# Patient Record
Sex: FEMALE | Race: WHITE | NOT HISPANIC OR LATINO | Employment: OTHER | URBAN - METROPOLITAN AREA
[De-identification: names, ages, dates, MRNs, and addresses within clinical notes are randomized per-mention and may not be internally consistent; named-entity substitution may affect disease eponyms.]

---

## 2017-01-01 ENCOUNTER — HOME CARE VISIT (OUTPATIENT)
Dept: HOSPICE | Facility: HOSPICE | Age: 82
End: 2017-01-01
Payer: MEDICARE

## 2017-01-01 ENCOUNTER — HOSPITAL ENCOUNTER (INPATIENT)
Facility: MEDICAL CENTER | Age: 82
LOS: 5 days | DRG: 093 | End: 2017-03-29
Attending: INTERNAL MEDICINE | Admitting: INTERNAL MEDICINE
Payer: COMMERCIAL

## 2017-01-01 VITALS — HEART RATE: 66 BPM | RESPIRATION RATE: 18 BRPM | DIASTOLIC BLOOD PRESSURE: 46 MMHG | SYSTOLIC BLOOD PRESSURE: 96 MMHG

## 2017-01-01 VITALS — DIASTOLIC BLOOD PRESSURE: 65 MMHG | RESPIRATION RATE: 18 BRPM | HEART RATE: 51 BPM | SYSTOLIC BLOOD PRESSURE: 118 MMHG

## 2017-01-01 VITALS — DIASTOLIC BLOOD PRESSURE: 87 MMHG | HEART RATE: 50 BPM | RESPIRATION RATE: 12 BRPM | SYSTOLIC BLOOD PRESSURE: 137 MMHG

## 2017-01-01 VITALS — RESPIRATION RATE: 22 BRPM | DIASTOLIC BLOOD PRESSURE: 60 MMHG | TEMPERATURE: 99 F | SYSTOLIC BLOOD PRESSURE: 120 MMHG

## 2017-01-01 VITALS — DIASTOLIC BLOOD PRESSURE: 60 MMHG | SYSTOLIC BLOOD PRESSURE: 111 MMHG | HEART RATE: 66 BPM | RESPIRATION RATE: 18 BRPM

## 2017-01-01 VITALS — HEART RATE: 50 BPM | DIASTOLIC BLOOD PRESSURE: 50 MMHG | RESPIRATION RATE: 16 BRPM | SYSTOLIC BLOOD PRESSURE: 102 MMHG

## 2017-01-01 VITALS — HEART RATE: 80 BPM | SYSTOLIC BLOOD PRESSURE: 125 MMHG | DIASTOLIC BLOOD PRESSURE: 64 MMHG | RESPIRATION RATE: 16 BRPM

## 2017-01-01 VITALS
BODY MASS INDEX: 20.77 KG/M2 | HEIGHT: 61 IN | DIASTOLIC BLOOD PRESSURE: 55 MMHG | SYSTOLIC BLOOD PRESSURE: 129 MMHG | RESPIRATION RATE: 18 BRPM | TEMPERATURE: 97.8 F | HEART RATE: 52 BPM | WEIGHT: 110 LBS | OXYGEN SATURATION: 97 %

## 2017-01-01 VITALS — HEART RATE: 66 BPM | RESPIRATION RATE: 18 BRPM

## 2017-01-01 VITALS — RESPIRATION RATE: 18 BRPM | HEART RATE: 55 BPM

## 2017-01-01 VITALS — SYSTOLIC BLOOD PRESSURE: 123 MMHG | DIASTOLIC BLOOD PRESSURE: 63 MMHG | HEART RATE: 59 BPM | RESPIRATION RATE: 16 BRPM

## 2017-01-01 VITALS
SYSTOLIC BLOOD PRESSURE: 116 MMHG | DIASTOLIC BLOOD PRESSURE: 54 MMHG | HEART RATE: 52 BPM | RESPIRATION RATE: 18 BRPM | SYSTOLIC BLOOD PRESSURE: 102 MMHG | HEART RATE: 50 BPM | DIASTOLIC BLOOD PRESSURE: 53 MMHG | RESPIRATION RATE: 18 BRPM

## 2017-01-01 VITALS — RESPIRATION RATE: 20 BRPM | HEART RATE: 100 BPM

## 2017-01-01 VITALS — HEART RATE: 55 BPM | DIASTOLIC BLOOD PRESSURE: 81 MMHG | SYSTOLIC BLOOD PRESSURE: 129 MMHG | RESPIRATION RATE: 18 BRPM

## 2017-01-01 VITALS — DIASTOLIC BLOOD PRESSURE: 58 MMHG | SYSTOLIC BLOOD PRESSURE: 108 MMHG | RESPIRATION RATE: 18 BRPM | HEART RATE: 63 BPM

## 2017-01-01 VITALS — SYSTOLIC BLOOD PRESSURE: 136 MMHG | RESPIRATION RATE: 16 BRPM | DIASTOLIC BLOOD PRESSURE: 68 MMHG | HEART RATE: 56 BPM

## 2017-01-01 VITALS — RESPIRATION RATE: 18 BRPM | HEART RATE: 51 BPM

## 2017-01-01 VITALS — RESPIRATION RATE: 18 BRPM | HEART RATE: 52 BPM

## 2017-01-01 VITALS — HEART RATE: 63 BPM | RESPIRATION RATE: 18 BRPM

## 2017-01-01 VITALS — RESPIRATION RATE: 18 BRPM | HEART RATE: 57 BPM

## 2017-01-01 VITALS — RESPIRATION RATE: 18 BRPM | DIASTOLIC BLOOD PRESSURE: 60 MMHG | SYSTOLIC BLOOD PRESSURE: 102 MMHG | HEART RATE: 66 BPM

## 2017-01-01 VITALS — SYSTOLIC BLOOD PRESSURE: 117 MMHG | DIASTOLIC BLOOD PRESSURE: 62 MMHG | HEART RATE: 52 BPM | RESPIRATION RATE: 18 BRPM

## 2017-01-01 VITALS — HEART RATE: 57 BPM | RESPIRATION RATE: 18 BRPM

## 2017-01-01 VITALS — SYSTOLIC BLOOD PRESSURE: 88 MMHG | RESPIRATION RATE: 18 BRPM | DIASTOLIC BLOOD PRESSURE: 41 MMHG | HEART RATE: 65 BPM

## 2017-01-01 VITALS — RESPIRATION RATE: 18 BRPM | HEART RATE: 49 BPM

## 2017-01-01 VITALS — HEART RATE: 52 BPM | RESPIRATION RATE: 18 BRPM

## 2017-01-01 VITALS — SYSTOLIC BLOOD PRESSURE: 104 MMHG | DIASTOLIC BLOOD PRESSURE: 43 MMHG | HEART RATE: 55 BPM | RESPIRATION RATE: 18 BRPM

## 2017-01-01 VITALS — HEART RATE: 60 BPM | RESPIRATION RATE: 18 BRPM

## 2017-01-01 VITALS — HEART RATE: 55 BPM | DIASTOLIC BLOOD PRESSURE: 69 MMHG | RESPIRATION RATE: 16 BRPM | SYSTOLIC BLOOD PRESSURE: 132 MMHG

## 2017-01-01 VITALS — RESPIRATION RATE: 16 BRPM | HEART RATE: 50 BPM

## 2017-01-01 VITALS — HEART RATE: 61 BPM | RESPIRATION RATE: 16 BRPM

## 2017-01-01 VITALS — SYSTOLIC BLOOD PRESSURE: 98 MMHG | RESPIRATION RATE: 18 BRPM | DIASTOLIC BLOOD PRESSURE: 45 MMHG | HEART RATE: 53 BPM

## 2017-01-01 VITALS — RESPIRATION RATE: 18 BRPM | HEART RATE: 51 BPM | SYSTOLIC BLOOD PRESSURE: 98 MMHG | DIASTOLIC BLOOD PRESSURE: 50 MMHG

## 2017-01-01 VITALS — HEART RATE: 59 BPM | RESPIRATION RATE: 18 BRPM

## 2017-01-01 VITALS — RESPIRATION RATE: 18 BRPM | DIASTOLIC BLOOD PRESSURE: 55 MMHG | SYSTOLIC BLOOD PRESSURE: 112 MMHG | HEART RATE: 60 BPM

## 2017-01-01 VITALS — DIASTOLIC BLOOD PRESSURE: 70 MMHG | SYSTOLIC BLOOD PRESSURE: 135 MMHG | HEART RATE: 56 BPM | RESPIRATION RATE: 16 BRPM

## 2017-01-01 VITALS — SYSTOLIC BLOOD PRESSURE: 117 MMHG | HEART RATE: 68 BPM | RESPIRATION RATE: 16 BRPM | DIASTOLIC BLOOD PRESSURE: 82 MMHG

## 2017-01-01 VITALS — RESPIRATION RATE: 18 BRPM | HEART RATE: 79 BPM

## 2017-01-01 VITALS — SYSTOLIC BLOOD PRESSURE: 115 MMHG | RESPIRATION RATE: 16 BRPM | DIASTOLIC BLOOD PRESSURE: 59 MMHG

## 2017-01-01 VITALS — RESPIRATION RATE: 18 BRPM | HEART RATE: 56 BPM | SYSTOLIC BLOOD PRESSURE: 140 MMHG | DIASTOLIC BLOOD PRESSURE: 59 MMHG

## 2017-01-01 VITALS — RESPIRATION RATE: 18 BRPM | HEART RATE: 50 BPM

## 2017-01-01 VITALS — RESPIRATION RATE: 16 BRPM

## 2017-01-01 PROCEDURE — G0155 HHCP-SVS OF CSW,EA 15 MIN: HCPCS

## 2017-01-01 PROCEDURE — 6650403 HCR  SHAMPOO CAPS - EA

## 2017-01-01 PROCEDURE — 700102 HCHG RX REV CODE 250 W/ 637 OVERRIDE(OP): Performed by: INTERNAL MEDICINE

## 2017-01-01 PROCEDURE — S9126 HOSPICE CARE, IN THE HOME, P: HCPCS

## 2017-01-01 PROCEDURE — G0299 HHS/HOSPICE OF RN EA 15 MIN: HCPCS

## 2017-01-01 PROCEDURE — 665035 HSPC ROOM AND BOARD PER DIEM

## 2017-01-01 PROCEDURE — G0156 HHCP-SVS OF AIDE,EA 15 MIN: HCPCS

## 2017-01-01 PROCEDURE — 700112 HCHG RX REV CODE 229: Performed by: INTERNAL MEDICINE

## 2017-01-01 PROCEDURE — A5120 SKIN BARRIER, WIPE OR SWAB: HCPCS

## 2017-01-01 PROCEDURE — A9270 NON-COVERED ITEM OR SERVICE: HCPCS | Performed by: INTERNAL MEDICINE

## 2017-01-01 PROCEDURE — A4554 DISPOSABLE UNDERPADS: HCPCS

## 2017-01-01 PROCEDURE — T2044 HOSPICE RESPITE CARE: HCPCS

## 2017-01-01 PROCEDURE — A4520 INCONTINENCE GARMENT ANYTYPE: HCPCS

## 2017-01-01 PROCEDURE — A4927 NON-STERILE GLOVES: HCPCS

## 2017-01-01 PROCEDURE — 770001 HCHG ROOM/CARE - MED/SURG/GYN PRIV*

## 2017-01-01 PROCEDURE — A6250 SKIN SEAL PROTECT MOISTURIZR: HCPCS

## 2017-01-01 PROCEDURE — 94760 N-INVAS EAR/PLS OXIMETRY 1: CPT

## 2017-01-01 PROCEDURE — 6650303 HCR  ALOE VESTA BATH/BODY SHAMPOO

## 2017-01-01 PROCEDURE — A4315 CATH W/DRAINAGE 2-WAY SILCNE: HCPCS

## 2017-01-01 PROCEDURE — 6650390 HCR  ORASWAB GREEN

## 2017-01-01 PROCEDURE — 306396 CUSHION, WAFFLE ADULT 17X17: Performed by: INTERNAL MEDICINE

## 2017-01-01 RX ORDER — TRAZODONE HYDROCHLORIDE 50 MG/1
50 TABLET ORAL
Status: DISCONTINUED | OUTPATIENT
Start: 2017-01-01 | End: 2017-01-01 | Stop reason: HOSPADM

## 2017-01-01 RX ORDER — AMOXICILLIN 250 MG
1 CAPSULE ORAL
Status: DISCONTINUED | OUTPATIENT
Start: 2017-01-01 | End: 2017-01-01 | Stop reason: HOSPADM

## 2017-01-01 RX ORDER — BACLOFEN 10 MG/1
5 TABLET ORAL 2 TIMES DAILY
Status: DISCONTINUED | OUTPATIENT
Start: 2017-01-01 | End: 2017-01-01 | Stop reason: HOSPADM

## 2017-01-01 RX ORDER — BISACODYL 10 MG
10 SUPPOSITORY, RECTAL RECTAL
Status: DISCONTINUED | OUTPATIENT
Start: 2017-01-01 | End: 2017-01-01 | Stop reason: HOSPADM

## 2017-01-01 RX ORDER — DOCUSATE SODIUM 100 MG/1
100 CAPSULE, LIQUID FILLED ORAL EVERY MORNING
Status: DISCONTINUED | OUTPATIENT
Start: 2017-01-01 | End: 2017-01-01 | Stop reason: HOSPADM

## 2017-01-01 RX ORDER — AMOXICILLIN 250 MG
1 CAPSULE ORAL NIGHTLY
Status: DISCONTINUED | OUTPATIENT
Start: 2017-01-01 | End: 2017-01-01 | Stop reason: HOSPADM

## 2017-01-01 RX ORDER — HYDROCODONE BITARTRATE AND ACETAMINOPHEN 10; 325 MG/1; MG/1
1 TABLET ORAL EVERY 6 HOURS PRN
Status: DISCONTINUED | OUTPATIENT
Start: 2017-01-01 | End: 2017-01-01

## 2017-01-01 RX ORDER — HYDROCODONE BITARTRATE AND ACETAMINOPHEN 10; 325 MG/1; MG/1
1 TABLET ORAL EVERY 4 HOURS PRN
Status: DISCONTINUED | OUTPATIENT
Start: 2017-01-01 | End: 2017-01-01 | Stop reason: HOSPADM

## 2017-01-01 RX ORDER — FUROSEMIDE 20 MG/1
20 TABLET ORAL
Status: DISCONTINUED | OUTPATIENT
Start: 2017-01-01 | End: 2017-01-01 | Stop reason: HOSPADM

## 2017-01-01 RX ORDER — LACTULOSE 20 G/30ML
30 SOLUTION ORAL
Status: DISCONTINUED | OUTPATIENT
Start: 2017-01-01 | End: 2017-01-01 | Stop reason: HOSPADM

## 2017-01-01 RX ORDER — METHADONE HYDROCHLORIDE 5 MG/1
5 TABLET ORAL 2 TIMES DAILY
Status: DISCONTINUED | OUTPATIENT
Start: 2017-01-01 | End: 2017-01-01 | Stop reason: HOSPADM

## 2017-01-01 RX ORDER — ENEMA 19; 7 G/133ML; G/133ML
1 ENEMA RECTAL
Status: DISCONTINUED | OUTPATIENT
Start: 2017-01-01 | End: 2017-01-01 | Stop reason: HOSPADM

## 2017-01-01 RX ORDER — METOPROLOL SUCCINATE 25 MG/1
25 TABLET, EXTENDED RELEASE ORAL
Status: DISCONTINUED | OUTPATIENT
Start: 2017-01-01 | End: 2017-01-01 | Stop reason: HOSPADM

## 2017-01-01 RX ORDER — OMEPRAZOLE 20 MG/1
20 CAPSULE, DELAYED RELEASE ORAL DAILY
Status: DISCONTINUED | OUTPATIENT
Start: 2017-01-01 | End: 2017-01-01 | Stop reason: HOSPADM

## 2017-01-01 RX ADMIN — HYDROCODONE BITARTRATE AND ACETAMINOPHEN 1 TABLET: 10; 325 TABLET ORAL at 12:38

## 2017-01-01 RX ADMIN — SENNOSIDES AND DOCUSATE SODIUM 1 TABLET: 8.6; 5 TABLET ORAL at 20:48

## 2017-01-01 RX ADMIN — DOCUSATE SODIUM 100 MG: 100 CAPSULE, LIQUID FILLED ORAL at 09:16

## 2017-01-01 RX ADMIN — DOCUSATE SODIUM 100 MG: 100 CAPSULE, LIQUID FILLED ORAL at 08:58

## 2017-01-01 RX ADMIN — LACTULOSE 30 ML: 10 SOLUTION ORAL at 06:09

## 2017-01-01 RX ADMIN — HYDROCODONE BITARTRATE AND ACETAMINOPHEN 1 TABLET: 10; 325 TABLET ORAL at 06:09

## 2017-01-01 RX ADMIN — HYDROCODONE BITARTRATE AND ACETAMINOPHEN 1 TABLET: 10; 325 TABLET ORAL at 20:17

## 2017-01-01 RX ADMIN — FUROSEMIDE 20 MG: 20 TABLET ORAL at 09:01

## 2017-01-01 RX ADMIN — METHADONE HYDROCHLORIDE 5 MG: 5 TABLET ORAL at 20:09

## 2017-01-01 RX ADMIN — METHADONE HYDROCHLORIDE 5 MG: 5 TABLET ORAL at 21:43

## 2017-01-01 RX ADMIN — HYDROCODONE BITARTRATE AND ACETAMINOPHEN 1 TABLET: 10; 325 TABLET ORAL at 00:09

## 2017-01-01 RX ADMIN — BACLOFEN 5 MG: 10 TABLET ORAL at 20:48

## 2017-01-01 RX ADMIN — FUROSEMIDE 20 MG: 20 TABLET ORAL at 08:37

## 2017-01-01 RX ADMIN — SENNOSIDES AND DOCUSATE SODIUM 1 TABLET: 8.6; 5 TABLET ORAL at 21:43

## 2017-01-01 RX ADMIN — SENNOSIDES AND DOCUSATE SODIUM 1 TABLET: 8.6; 5 TABLET ORAL at 20:17

## 2017-01-01 RX ADMIN — FUROSEMIDE 20 MG: 20 TABLET ORAL at 09:33

## 2017-01-01 RX ADMIN — DOCUSATE SODIUM 100 MG: 100 CAPSULE, LIQUID FILLED ORAL at 09:32

## 2017-01-01 RX ADMIN — METOPROLOL SUCCINATE 25 MG: 25 TABLET, EXTENDED RELEASE ORAL at 08:36

## 2017-01-01 RX ADMIN — BACLOFEN 5 MG: 10 TABLET ORAL at 09:15

## 2017-01-01 RX ADMIN — METOPROLOL SUCCINATE 25 MG: 25 TABLET, EXTENDED RELEASE ORAL at 12:29

## 2017-01-01 RX ADMIN — DOCUSATE SODIUM 100 MG: 100 CAPSULE, LIQUID FILLED ORAL at 12:29

## 2017-01-01 RX ADMIN — LACTULOSE 30 ML: 10 SOLUTION ORAL at 05:04

## 2017-01-01 RX ADMIN — BACLOFEN 5 MG: 10 TABLET ORAL at 20:08

## 2017-01-01 RX ADMIN — FUROSEMIDE 20 MG: 20 TABLET ORAL at 09:16

## 2017-01-01 RX ADMIN — OMEPRAZOLE 20 MG: 20 CAPSULE, DELAYED RELEASE ORAL at 09:17

## 2017-01-01 RX ADMIN — TRAZODONE HYDROCHLORIDE 50 MG: 50 TABLET ORAL at 20:17

## 2017-01-01 RX ADMIN — HYDROCODONE BITARTRATE AND ACETAMINOPHEN 1 TABLET: 10; 325 TABLET ORAL at 13:17

## 2017-01-01 RX ADMIN — METHADONE HYDROCHLORIDE 5 MG: 5 TABLET ORAL at 08:36

## 2017-01-01 RX ADMIN — TRAZODONE HYDROCHLORIDE 50 MG: 50 TABLET ORAL at 19:42

## 2017-01-01 RX ADMIN — BACLOFEN 5 MG: 10 TABLET ORAL at 08:33

## 2017-01-01 RX ADMIN — SENNOSIDES AND DOCUSATE SODIUM 1 TABLET: 8.6; 5 TABLET ORAL at 20:10

## 2017-01-01 RX ADMIN — HYDROCODONE BITARTRATE AND ACETAMINOPHEN 1 TABLET: 10; 325 TABLET ORAL at 09:02

## 2017-01-01 RX ADMIN — HYDROCODONE BITARTRATE AND ACETAMINOPHEN 1 TABLET: 10; 325 TABLET ORAL at 18:47

## 2017-01-01 RX ADMIN — TRAZODONE HYDROCHLORIDE 50 MG: 50 TABLET ORAL at 21:43

## 2017-01-01 RX ADMIN — OMEPRAZOLE 20 MG: 20 CAPSULE, DELAYED RELEASE ORAL at 08:34

## 2017-01-01 RX ADMIN — DOCUSATE SODIUM 100 MG: 100 CAPSULE, LIQUID FILLED ORAL at 08:36

## 2017-01-01 RX ADMIN — BACLOFEN 5 MG: 10 TABLET ORAL at 09:32

## 2017-01-01 RX ADMIN — HYDROCODONE BITARTRATE AND ACETAMINOPHEN 1 TABLET: 10; 325 TABLET ORAL at 05:03

## 2017-01-01 RX ADMIN — METHADONE HYDROCHLORIDE 5 MG: 5 TABLET ORAL at 09:01

## 2017-01-01 RX ADMIN — DOCUSATE SODIUM 100 MG: 100 CAPSULE, LIQUID FILLED ORAL at 08:37

## 2017-01-01 RX ADMIN — METOPROLOL SUCCINATE 25 MG: 25 TABLET, EXTENDED RELEASE ORAL at 08:41

## 2017-01-01 RX ADMIN — BACLOFEN 5 MG: 10 TABLET ORAL at 08:36

## 2017-01-01 RX ADMIN — METHADONE HYDROCHLORIDE 5 MG: 5 TABLET ORAL at 20:48

## 2017-01-01 RX ADMIN — HYDROCODONE BITARTRATE AND ACETAMINOPHEN 1 TABLET: 10; 325 TABLET ORAL at 01:56

## 2017-01-01 RX ADMIN — BACLOFEN 5 MG: 10 TABLET ORAL at 19:41

## 2017-01-01 RX ADMIN — OMEPRAZOLE 20 MG: 20 CAPSULE, DELAYED RELEASE ORAL at 08:36

## 2017-01-01 RX ADMIN — METHADONE HYDROCHLORIDE 5 MG: 5 TABLET ORAL at 09:16

## 2017-01-01 RX ADMIN — BACLOFEN 5 MG: 10 TABLET ORAL at 20:17

## 2017-01-01 RX ADMIN — METOPROLOL SUCCINATE 25 MG: 25 TABLET, EXTENDED RELEASE ORAL at 09:17

## 2017-01-01 RX ADMIN — Medication 10 MG: at 12:58

## 2017-01-01 RX ADMIN — BACLOFEN 5 MG: 10 TABLET ORAL at 08:59

## 2017-01-01 RX ADMIN — METHADONE HYDROCHLORIDE 5 MG: 5 TABLET ORAL at 09:33

## 2017-01-01 RX ADMIN — METHADONE HYDROCHLORIDE 5 MG: 5 TABLET ORAL at 08:37

## 2017-01-01 RX ADMIN — OMEPRAZOLE 20 MG: 20 CAPSULE, DELAYED RELEASE ORAL at 08:58

## 2017-01-01 RX ADMIN — SENNOSIDES AND DOCUSATE SODIUM 1 TABLET: 8.6; 5 TABLET ORAL at 19:42

## 2017-01-01 RX ADMIN — HYDROCODONE BITARTRATE AND ACETAMINOPHEN 1 TABLET: 10; 325 TABLET ORAL at 13:12

## 2017-01-01 RX ADMIN — HYDROCODONE BITARTRATE AND ACETAMINOPHEN 1 TABLET: 10; 325 TABLET ORAL at 17:14

## 2017-01-01 RX ADMIN — METHADONE HYDROCHLORIDE 5 MG: 5 TABLET ORAL at 19:42

## 2017-01-01 RX ADMIN — METHADONE HYDROCHLORIDE 5 MG: 5 TABLET ORAL at 20:17

## 2017-01-01 RX ADMIN — BACLOFEN 5 MG: 10 TABLET ORAL at 21:43

## 2017-01-01 RX ADMIN — METOPROLOL SUCCINATE 25 MG: 25 TABLET, EXTENDED RELEASE ORAL at 09:33

## 2017-01-01 RX ADMIN — OMEPRAZOLE 20 MG: 20 CAPSULE, DELAYED RELEASE ORAL at 09:33

## 2017-01-01 RX ADMIN — TRAZODONE HYDROCHLORIDE 50 MG: 50 TABLET ORAL at 20:48

## 2017-01-01 RX ADMIN — TRAZODONE HYDROCHLORIDE 50 MG: 50 TABLET ORAL at 20:09

## 2017-01-01 RX ADMIN — HYDROCODONE BITARTRATE AND ACETAMINOPHEN 1 TABLET: 10; 325 TABLET ORAL at 14:59

## 2017-01-01 RX ADMIN — HYDROCODONE BITARTRATE AND ACETAMINOPHEN 1 TABLET: 10; 325 TABLET ORAL at 21:26

## 2017-01-01 SDOH — ECONOMIC STABILITY: HOUSING INSECURITY: UNSAFE APPLIANCES: 0

## 2017-01-01 SDOH — ECONOMIC STABILITY: HOUSING INSECURITY: UNSAFE COOKING RANGE AREA: 0

## 2017-01-01 SDOH — ECONOMIC STABILITY: HOUSING INSECURITY: HOME SAFETY: PT AT RESPITE IN SOUTH MEADOWS.

## 2017-01-01 ASSESSMENT — ENCOUNTER SYMPTOMS
LAST BOWEL MOVEMENT: 64415
STOOL FREQUENCY: LESS THAN DAILY
LAST BOWEL MOVEMENT: 64406
STOOL FREQUENCY: LESS THAN DAILY
LAST BOWEL MOVEMENT: 64348
LAST BOWEL MOVEMENT: 64363
BOWEL PATTERN NORMAL: 1
LAST BOWEL MOVEMENT: 64318
BOWEL PATTERN NORMAL: 1
LAST BOWEL MOVEMENT: 64431
LOWER EXTREMITY EDEMA: 1
STOOL FREQUENCY: LESS THAN DAILY
BOWEL PATTERN NORMAL: 1
LAST BOWEL MOVEMENT: 64404
FATIGUE: 1
CONSTIPATION: 1
FATIGUES EASILY: 1
SLEEP QUALITY: ADEQUATE
STOOL FREQUENCY: LESS THAN DAILY
FORGETFULNESS: 1
LAST BOWEL MOVEMENT: 64411
BOWEL PATTERN NORMAL: 1
LAST BOWEL MOVEMENT: 64438
SLEEP QUALITY: FAIR
LAST BOWEL MOVEMENT: 64420
STOOL FREQUENCY: LESS THAN DAILY
BOWEL PATTERN NORMAL: 1
FATIGUES EASILY: 1
SLEEP QUALITY: FAIR
BOWEL PATTERN NORMAL: 1
STOOL FREQUENCY: LESS THAN DAILY
LAST BOWEL MOVEMENT: 64295
STOOL FREQUENCY: DAILY
SOMNOLENCE: 1
FORGETFULNESS: 1
SLEEP QUALITY: ADEQUATE
LAST BOWEL MOVEMENT: 64372
LAST BOWEL MOVEMENT: 64316
SLEEP QUALITY: ADEQUATE
DRY SKIN: 1
STOOL FREQUENCY: LESS THAN DAILY
LAST BOWEL MOVEMENT: 64415
BOWEL PATTERN NORMAL: 1
DESCRIPTION OF MEMORY LOSS: LONG TERM
FORGETFULNESS: 1
LAST BOWEL MOVEMENT: 64322
BOWEL PATTERN NORMAL: 1
FATIGUE: 1
LAST BOWEL MOVEMENT: 64389
DRY SKIN: 1
STOOL FREQUENCY: LESS THAN DAILY
FATIGUE: 1
SLEEP QUALITY: ADEQUATE
DRY SKIN: 1
STOOL FREQUENCY: DAILY
SLEEP QUALITY: FAIR
SKIN LESIONS: 1
SLEEP QUALITY: FAIR
BOWEL PATTERN NORMAL: 1
LAST BOWEL MOVEMENT: 64357
LAST BOWEL MOVEMENT: 64384
BOWEL PATTERN NORMAL: 1
STOOL FREQUENCY: LESS THAN DAILY
BOWEL PATTERN NORMAL: 1
SLEEP QUALITY: FAIR
FATIGUES EASILY: 1
SKIN LESIONS: 1
LAST BOWEL MOVEMENT: 64440
STOOL FREQUENCY: DAILY
BOWEL PATTERN NORMAL: 1
DRY SKIN: 1
FATIGUES EASILY: 1
FORGETFULNESS: 1
FORGETFULNESS: 1
LAST BOWEL MOVEMENT: 64449
BOWEL PATTERN NORMAL: 1
STOOL FREQUENCY: LESS THAN DAILY
LAST BOWEL MOVEMENT: 64364
SLEEP QUALITY: ADEQUATE
FATIGUES EASILY: 1
STOOL FREQUENCY: DAILY
STOOL FREQUENCY: LESS THAN DAILY
BOWEL PATTERN NORMAL: 1
STOOL FREQUENCY: LESS THAN DAILY
LAST BOWEL MOVEMENT: 64375
STOOL FREQUENCY: DAILY
BOWEL PATTERN NORMAL: 1
FATIGUE: 1
BOWEL PATTERN NORMAL: 1
STOOL FREQUENCY: LESS THAN DAILY
STOOL FREQUENCY: LESS THAN DAILY
DESCRIPTION OF MEMORY LOSS: SHORT TERM
BOWEL PATTERN NORMAL: 1
BOWEL PATTERN NORMAL: 1
LAST BOWEL MOVEMENT: 64359
LAST BOWEL MOVEMENT: 64399
LAST BOWEL MOVEMENT: 64446
FATIGUE: 1
STOOL FREQUENCY: LESS THAN DAILY
STOOL FREQUENCY: LESS THAN DAILY
LAST BOWEL MOVEMENT: 64287
BOWEL PATTERN NORMAL: 1
BOWEL PATTERN NORMAL: 1
CONSTIPATION: 1
STOOL FREQUENCY: DAILY
BOWEL PATTERN NORMAL: 1
SLEEP QUALITY: FAIR
SLEEP QUALITY: FAIR
LAST BOWEL MOVEMENT: 64355
SOMNOLENCE: 1
LAST BOWEL MOVEMENT: 64407
BOWEL PATTERN NORMAL: 1
SLEEP QUALITY: FAIR
LAST BOWEL MOVEMENT: 64381
STOOL FREQUENCY: LESS THAN DAILY
LAST BOWEL MOVEMENT: 64426
STOOL FREQUENCY: LESS THAN DAILY
STOOL FREQUENCY: LESS THAN DAILY
STOOL FREQUENCY: DAILY
BOWEL PATTERN NORMAL: 1
STOOL FREQUENCY: LESS THAN DAILY
BOWEL PATTERN NORMAL: 1
LAST BOWEL MOVEMENT: 64396
BOWEL PATTERN NORMAL: 1
DESCRIPTION OF MEMORY LOSS: SHORT TERM
BOWEL PATTERN NORMAL: 1
STOOL FREQUENCY: LESS THAN DAILY
STOOL FREQUENCY: LESS THAN DAILY
FATIGUE: 1
BOWEL PATTERN NORMAL: 1
FATIGUE: 1
BOWEL PATTERN NORMAL: 1
SLEEP QUALITY: FAIR
STOOL FREQUENCY: LESS THAN DAILY
LAST BOWEL MOVEMENT: 64393
FATIGUE: 1
LAST BOWEL MOVEMENT: 64348
TREMORS: 1
LAST BOWEL MOVEMENT: 64341
DRY SKIN: 1

## 2017-01-01 ASSESSMENT — SOCIAL DETERMINANTS OF HEALTH (SDOH)
ACTIVE STRESSOR - HEALTH CHANGES: 1
ACTIVE STRESSOR - HEALTH CHANGES: 1
ACTIVE STRESSOR - LOSS OF CONTROL: 1
ACTIVE STRESSOR - HEALTH CHANGES: 1
ACTIVE STRESSOR - HEALTH CHANGES: 1
ACTIVE STRESSOR - EXPRESSED EMOTIONAL NEED: 1
ACTIVE STRESSOR - LOSS OF CONTROL: 1
ACTIVE STRESSOR - HEALTH CHANGES: 1
ACTIVE STRESSOR - NO STRESS FACTORS: 1
ACTIVE STRESSOR - HEALTH CHANGES: 1
ACTIVE STRESSOR - HEALTH CHANGES: 1
ACTIVE STRESSOR - LACK OF CAREGIVERS: 1
ACTIVE STRESSOR - HEALTH CHANGES: 1
ACTIVE STRESSOR - HEALTH CHANGES: 1
ACTIVE STRESSOR - LOSS OF CONTROL: 1
ACTIVE STRESSOR - HEALTH CHANGES: 1

## 2017-01-01 ASSESSMENT — PAIN SCALES - WONG BAKER
WONGBAKER_NUMERICALRESPONSE: HURTS A WHOLE LOT
WONGBAKER_NUMERICALRESPONSE: DOESN'T HURT AT ALL

## 2017-01-01 ASSESSMENT — PAIN SCALES - GENERAL
PAINLEVEL_OUTOF10: 5
PAINLEVEL_OUTOF10: 3
PAINLEVEL_OUTOF10: 6
PAINLEVEL_OUTOF10: 3
PAINLEVEL_OUTOF10: 5
PAINLEVEL_OUTOF10: 3
PAINLEVEL_OUTOF10: 5
PAINLEVEL_OUTOF10: 6
PAINLEVEL_OUTOF10: 0
PAINLEVEL_OUTOF10: 6
PAINLEVEL_OUTOF10: 3
PAINLEVEL_OUTOF10: 5
PAINLEVEL_OUTOF10: 0
PAINLEVEL_OUTOF10: 4
PAINLEVEL_OUTOF10: 4
PAINLEVEL_OUTOF10: 3
PAINLEVEL_OUTOF10: 4
PAINLEVEL_OUTOF10: 3
PAINLEVEL_OUTOF10: 5
PAINLEVEL_OUTOF10: 4
PAINLEVEL_OUTOF10: 6
PAINLEVEL_OUTOF10: 5
PAINLEVEL_OUTOF10: 5
PAINLEVEL_OUTOF10: 6
PAINLEVEL_OUTOF10: 5
PAINLEVEL_OUTOF10: 3

## 2017-03-22 NOTE — PROGRESS NOTES
Future direct admit for 3/24/2017 @ 1000 by Dr. Dueñas for Inpatient Respite Care.  ADT signed & held on 3/24/2017 with telephone order, needs to be released upon pt arrival.  MD to write inpatient admit orders in [facility].  Pt coming by ground.

## 2017-03-24 NOTE — PROGRESS NOTES
"Request from daughter/patient for Norco to be Q4 vs Q6. States \"pain gets too high when every 6 hours\". Page to Dr. Dueñas for update on request. Awaiting return call.   "

## 2017-03-24 NOTE — H&P
CHIEF COMPLAINT:  Admitted for a 5-day respite stay under [facility].    HISTORY OF PRESENT ILLNESS:  The patient is a 89-year-old female with multiple   medical problems who was admitted to [facility] several months ago for   end of life care.  The patient's main problem has been chronic pain requiring   routine narcotic use.  In general, she is quite debilitated and weak requiring   full care by her family.  She has had no recent falls or infection and now   is being admitted for respite stay, so that her family can have a break from   her care.    PAST MEDICAL HISTORY:  Atrial fibrillation, not currently on anticoagulation;   right lower extremity DVT 1 year ago, osteoarthritis with chronic hip and   shoulder pain, remote history of breast cancer, GERD, and recurrent   pyelonephritis.    MEDICATIONS:  Trazodone 50 mg p.o. at bedtime, Lasix 20 mg p.o. daily,   potassium chloride 10 mEq p.o. daily, Toprol-XL 25 mg p.o. daily, methadone 5   mg p.o. b.i.d., baclofen 5 mg p.o. b.i.d., Norco 10/325 p.o. q. 6 hours p.r.n.   pain and bowel medication.    FAMILY HISTORY:  Noncontributory.    SOCIAL HISTORY:  Patient is a nonsmoker and does not drink alcohol.  She lives   with her daughter in [place].    REVIEW OF SYSTEMS:  Essentially negative, except for chronic pain which is   palliated by her current medical regimen.    PHYSICAL EXAMINATION:  VITAL SIGNS:  Please see nurse's flow chart.  HEENT:  Negative.  LUNGS:  Clear.  HEART:  Irregularly irregular S1, S2.  ABDOMEN:  Soft, nontender, positive bowel sounds.  EXTREMITIES:  No edema.  Generalized weakness and reduced muscle mass.    ASSESSMENT AND PLAN:  A 89-year-old female, currently with [facility] for   debility and chronic pain as well as history of recurrent   pyelonephritis/urinary tract infections.  She will be continued on her usual   outpatient medications as above with her safety being monitored by the nursing   staff at [facility].  He will be  followed while in the hospital by the   [facility] staff as well.  Anticipate discharge in 5 days to home under   the care of her daughter.       ____________________________________     HALLIE RAMIREZ MD MS / JOSUE    DD:  03/24/2017 09:46:48  DT:  03/24/2017 11:41:37    D#:  [id number]  Job#:  [id number]

## 2017-03-25 NOTE — CARE PLAN
Problem: Communication  Goal: The ability to communicate needs accurately and effectively will improve  Outcome: PROGRESSING AS EXPECTED  Educate patient on expressing needs. Teach use of call light.  Concerns addressed and questions answered regarding plan of care.     Problem: Safety  Goal: Will remain free from falls  Outcome: PROGRESSING AS EXPECTED  Patient educated to call for assisstance, treaded slipper socks, call light w/in reach.

## 2017-03-25 NOTE — CARE PLAN
Problem: Safety  Goal: Will remain free from injury  Outcome: PROGRESSING AS EXPECTED  Goal: Will remain free from falls  Intervention: Implement fall precautions  Room/floor clear. Non skid socks on. Proper signs up. Bed alarm on, bed low and locked.  Call light and belongings within reach.  Hourly rounding in place to make sure needs are met.      Problem: Venous Thromboembolism (VTW)/Deep Vein Thrombosis (DVT) Prevention:  Goal: Patient will participate in Venous Thrombosis (VTE)/Deep Vein Thrombosis (DVT)Prevention Measures  Outcome: PROGRESSING AS EXPECTED  Intervention: Encourage patient to perform ankle flex, foot rotation, and knee flex exercises in addition to other prophylatic measures every hour while awake   Encourage to perform flexion of the feet while in bed and awake, verbalize understanding.

## 2017-03-26 NOTE — PROGRESS NOTES
Received report from night shift nurse. Assumed patient care at 0700. Patient is A&O x 4, vital signs stable. Patient lying comfortably in bed, no sign of SOB or any sort of distress. Assessment completed. Labs noted. Pt c/o pain to her leg and shoulder. Given pain meds via MAR. Fall precautions in place. Educated pt to call for assistance. Call light within reach. Continue to monitor.

## 2017-03-26 NOTE — CARE PLAN
Problem: Venous Thromboembolism (VTW)/Deep Vein Thrombosis (DVT) Prevention:  Goal: Patient will participate in Venous Thrombosis (VTE)/Deep Vein Thrombosis (DVT)Prevention Measures  Intervention: Encourage patient to perform ankle flex, foot rotation, and knee flex exercises in addition to other prophylatic measures every hour while awake  Encouraged patient to perform ankle flex, foot rotation every hour while awake.   Patient stated that she keeps doing it while awake  Continue to monitor

## 2017-03-26 NOTE — CARE PLAN
Problem: Safety  Goal: Will remain free from injury  Outcome: PROGRESSING AS EXPECTED  Educated patient to call for assistance.   Fall precaution in place. Upper bed-rails up. Call light and personal belongings within reach.  Continue to monitor

## 2017-03-26 NOTE — PROGRESS NOTES
Received report from day RN. Pt resting in bed no signs of distress. VSS. AOx4, pt denies pain and needs at this time. Educated pt to call prior to getting out of bed, fall precautions in place, call light in reach will monitor.

## 2017-03-27 NOTE — PROGRESS NOTES
Report received from Yahaira COTA. Assumed care of pt. Pt sitting up in bed. Pt A&Ox4. POC discussed. Pt verbalized understanding. No signs of distress or discomfort noted at this time. Pt instructed to use call light for assistance. Call light and personal belongings within reach. Pt denies any concerns at this time. All questions answered. Safety measures in place. Will continue to monitor.

## 2017-03-27 NOTE — CARE PLAN
Problem: Bowel/Gastric:  Goal: Normal bowel function is maintained or improved  Outcome: PROGRESSING AS EXPECTED  Last BM 3/25 stool softener given per MAR.

## 2017-03-27 NOTE — PROGRESS NOTES
Received report from day RN. Pt resting in bed no signs of distress. VSS. AOx4 discussed POC and comfort goals. Pt denies needs at this time. Educated on fall risks and to use call light before getting out of bed. Fall precautions in place, call light and personal belongings in reach will monitor.

## 2017-03-27 NOTE — CARE PLAN
Problem: Safety  Goal: Will remain free from injury  Outcome: PROGRESSING AS EXPECTED  Call light and personal belongings within reach. Bed in lowest position. Bed rails up x2. Hourly rounding. Treaded slippers in place.     Problem: Bowel/Gastric:  Goal: Normal bowel function is maintained or improved  Outcome: PROGRESSING AS EXPECTED  Pt ambulates up to commode with assistance.

## 2017-03-28 NOTE — CARE PLAN
Problem: Safety  Goal: Will remain free from falls  Intervention: Implement fall precautions  Room/floor clear. Non skid socks on. Proper signs up. Bed alarm on, bed low and locked. Call light and belonging within reach. Hourly rounding in place to make sure needs are met.           Problem: Infection  Goal: Will remain free from infection  Intervention: Implement standard precautions and perform hand washing before and after patient contact  Hand washing every encounter. IV ports scrubbed with alcohol when hanging medicine. Patient watch for s/s of infection. Patient taught to report s/s of infection, verbalized understanding.           Problem: Venous Thromboembolism (VTW)/Deep Vein Thrombosis (DVT) Prevention:  Goal: Patient will participate in Venous Thrombosis (VTE)/Deep Vein Thrombosis (DVT)Prevention Measures  Intervention: Encourage patient to perform ankle flex, foot rotation, and knee flex exercises in addition to other prophylatic measures every hour while awake  Encouraged to perform coughing and deep breathing, verbalized understanding.

## 2017-03-28 NOTE — PROGRESS NOTES
BS report received. Patient in bed, alert and oriented. No c/o pain, nausea or sob at this time. POC discussed, verbalized understanding. Bed low and locked, bed alarm on. Call light and belonging within reach and demonstrated use of call light. Fall precaution in effect. Hourly rounding in place.

## 2017-03-28 NOTE — PROGRESS NOTES
Report received from NOC, Pt assessed, Pt awake and alert, resting calmly, medications given with applesauce. Pt turned and repositioned Q 2 hours, POC reviewed and call light within reach.

## 2017-03-29 NOTE — PROGRESS NOTES
0700: Received report from CIPRIANO Gutierrez.  Pt is in bed resting, O2 @ 2lpm, no IV.  Bed in lowest position, Bed alarm set, call light within reach.    0859: Medications given, see MAR, assessment completed, see Doc Flowsheets.  Pt in bed resting, bed alarm set, call light within reach.      1000:  Notified that the patient would be transferred via [email or web address] by van at 1500.    1230: Patient reports pain, medication given, please see MAR.    1500: [facility] in room to take patient home, all patient belongings collected and sent with patient.

## 2017-03-29 NOTE — DISCHARGE PLANNING
SANGEETHA called [facility] and spoke to SANGEETHA Galvan and was informed that this patient is discharging today.  Mandy informed this SW that [facility] would be her at 3pm this afternoon to  the patient and take her home. SANGEETHA discussed this with the Floor RN.

## 2017-03-29 NOTE — CARE PLAN
Problem: Safety  Goal: Will remain free from falls  Able to demo call light & within reach, non-skid socks in place, personal belongings within reach, room floor free of clutter, bed alarm on through out shift    Problem: Discharge Barriers/Planning  Goal: Patient’s continuum of care needs will be met  Pt plans on discharging back home with family today 03/29

## 2018-05-17 NOTE — DISCHARGE SUMMARY
DATE OF DISCHARGE:  03/29/2017    DISCHARGE DIAGNOSES:  Severe debility and bedbound status, atrial   fibrillation, osteoarthritis with chronic hip and shoulder pain, remote   history of breast cancer, recurrent pyelonephritis.    HOSPITAL COURSE:  The patient is a 89-year-old female with multiple medical   problems who has been on [facility] for several months for comfort care at   end of life.  The patient was admitted to [facility] 5 days ago for respite care   so that her family could have a break.  She had no procedures and no untoward   events during her hospitalization.  She was continued on her usual home   medications with good control of her symptoms.  She now is being discharged   home to the care of her daughter, she will continue to be followed by the   [facility] team.       ____________________________________     Jackie Dueñas MD MLS / JOSUE    DD:  05/17/2018 09:42:03  DT:  05/17/2018 16:55:38    D#:  [id number]  Job#:  [id number]
